# Patient Record
Sex: MALE | Race: BLACK OR AFRICAN AMERICAN | URBAN - METROPOLITAN AREA
[De-identification: names, ages, dates, MRNs, and addresses within clinical notes are randomized per-mention and may not be internally consistent; named-entity substitution may affect disease eponyms.]

---

## 2022-04-10 ENCOUNTER — HISTORICAL (OUTPATIENT)
Dept: ADMINISTRATIVE | Facility: HOSPITAL | Age: 22
End: 2022-04-10

## 2022-04-26 VITALS
BODY MASS INDEX: 23.29 KG/M2 | OXYGEN SATURATION: 100 % | HEIGHT: 72 IN | SYSTOLIC BLOOD PRESSURE: 121 MMHG | WEIGHT: 171.94 LBS | DIASTOLIC BLOOD PRESSURE: 68 MMHG

## 2024-05-30 ENCOUNTER — OFFICE VISIT (OUTPATIENT)
Dept: URGENT CARE | Facility: CLINIC | Age: 24
End: 2024-05-30

## 2024-05-30 ENCOUNTER — HOSPITAL ENCOUNTER (OUTPATIENT)
Dept: RADIOLOGY | Facility: HOSPITAL | Age: 24
Discharge: HOME | End: 2024-05-30
Attending: NURSE PRACTITIONER

## 2024-05-30 VITALS
TEMPERATURE: 98 F | BODY MASS INDEX: 20.45 KG/M2 | HEART RATE: 57 BPM | OXYGEN SATURATION: 100 % | SYSTOLIC BLOOD PRESSURE: 144 MMHG | HEIGHT: 72 IN | RESPIRATION RATE: 18 BRPM | WEIGHT: 151 LBS | DIASTOLIC BLOOD PRESSURE: 86 MMHG

## 2024-05-30 DIAGNOSIS — R11.2 NAUSEA AND VOMITING, UNSPECIFIED VOMITING TYPE: ICD-10-CM

## 2024-05-30 DIAGNOSIS — R10.31 INTERMITTENT RIGHT LOWER QUADRANT ABDOMINAL PAIN: Primary | ICD-10-CM

## 2024-05-30 DIAGNOSIS — R82.90 ABNORMAL URINE: ICD-10-CM

## 2024-05-30 LAB
AMORPH URATE CRY URNS QL MICRO: ABNORMAL /UL
BACTERIA #/AREA URNS AUTO: ABNORMAL /HPF
BILIRUB UR QL STRIP.AUTO: NEGATIVE
BILIRUB UR QL STRIP: NEGATIVE
C TRACH DNA SPEC QL NAA+PROBE: NOT DETECTED
CLARITY UR: ABNORMAL
COLOR UR AUTO: ABNORMAL
GLUCOSE UR QL STRIP: NEGATIVE
GLUCOSE UR QL STRIP: NORMAL
HGB UR QL STRIP: ABNORMAL
HYALINE CASTS #/AREA URNS LPF: ABNORMAL /LPF
KETONES UR QL STRIP: ABNORMAL
KETONES UR QL STRIP: POSITIVE
LEUKOCYTE ESTERASE UR QL STRIP: NEGATIVE
LEUKOCYTE ESTERASE UR QL STRIP: NEGATIVE
MUCOUS THREADS URNS QL MICRO: ABNORMAL /LPF
N GONORRHOEA DNA SPEC QL NAA+PROBE: NOT DETECTED
NITRITE UR QL STRIP: NEGATIVE
PH UR STRIP: 6.5 [PH]
PH, POC UA: 6
POC BLOOD, URINE: POSITIVE
POC NITRATES, URINE: NEGATIVE
PROT UR QL STRIP: ABNORMAL
PROT UR QL STRIP: POSITIVE
RBC #/AREA URNS AUTO: ABNORMAL /HPF
SOURCE (OHS): NORMAL
SP GR UR STRIP.AUTO: 1.04 (ref 1–1.03)
SP GR UR STRIP: 1.03 (ref 1–1.03)
SQUAMOUS #/AREA URNS LPF: ABNORMAL /HPF
UROBILINOGEN UR STRIP-ACNC: 2 (ref 0.3–2.2)
UROBILINOGEN UR STRIP-ACNC: ABNORMAL
WBC #/AREA URNS AUTO: ABNORMAL /HPF

## 2024-05-30 PROCEDURE — 99203 OFFICE O/P NEW LOW 30 MIN: CPT | Mod: S$PBB,,, | Performed by: NURSE PRACTITIONER

## 2024-05-30 PROCEDURE — 99214 OFFICE O/P EST MOD 30 MIN: CPT | Mod: PBBFAC,25 | Performed by: NURSE PRACTITIONER

## 2024-05-30 PROCEDURE — 81001 URINALYSIS AUTO W/SCOPE: CPT | Performed by: NURSE PRACTITIONER

## 2024-05-30 PROCEDURE — 87491 CHLMYD TRACH DNA AMP PROBE: CPT | Performed by: NURSE PRACTITIONER

## 2024-05-30 PROCEDURE — 87591 N.GONORRHOEAE DNA AMP PROB: CPT | Performed by: NURSE PRACTITIONER

## 2024-05-30 PROCEDURE — 25000003 PHARM REV CODE 250

## 2024-05-30 PROCEDURE — 87661 TRICHOMONAS VAGINALIS AMPLIF: CPT | Performed by: NURSE PRACTITIONER

## 2024-05-30 PROCEDURE — 74019 RADEX ABDOMEN 2 VIEWS: CPT | Mod: TC

## 2024-05-30 PROCEDURE — 81003 URINALYSIS AUTO W/O SCOPE: CPT | Mod: PBBFAC | Performed by: NURSE PRACTITIONER

## 2024-05-30 RX ORDER — ACETAMINOPHEN 500 MG
1000 TABLET ORAL
Status: COMPLETED | OUTPATIENT
Start: 2024-05-30 | End: 2024-05-30

## 2024-05-30 RX ORDER — ONDANSETRON 4 MG/1
4 TABLET, ORALLY DISINTEGRATING ORAL
Status: COMPLETED | OUTPATIENT
Start: 2024-05-30 | End: 2024-05-30

## 2024-05-30 RX ORDER — DOXYCYCLINE HYCLATE 100 MG
100 TABLET ORAL 2 TIMES DAILY
Qty: 14 TABLET | Refills: 0 | Status: SHIPPED | OUTPATIENT
Start: 2024-05-30 | End: 2024-06-06

## 2024-05-30 RX ADMIN — ACETAMINOPHEN 1000 MG: 500 TABLET ORAL at 04:05

## 2024-05-30 RX ADMIN — ONDANSETRON 4 MG: 4 TABLET, ORALLY DISINTEGRATING ORAL at 04:05

## 2024-05-30 NOTE — PROGRESS NOTES
"Subjective:      Patient ID: Naeem Lopez is a 23 y.o. male.    Vitals:  height is 5' 11.65" (1.82 m) and weight is 68.5 kg (151 lb). His oral temperature is 97.6 °F (36.4 °C). His blood pressure is 144/86 (abnormal) and his pulse is 57 (abnormal). His respiration is 18 and oxygen saturation is 100%.     Chief Complaint: Hip Pain (Emesis, right sided pain, started a few days ago)    Hip Pain      Pt presents with c/o lower right abdominal pain that radiates to groin and hip on right side that started Sunday 4 days ago, resolved without treatment for 3 days and came back today worse than before. Pain worsened today and he developed vomiting this morning, Reports 2 episodes of vomiting today. Pt denies heavy lifting, trauma or fall. Pt reports pain is worse with walking Pt reports that he stopped Marijuana use 2-3 days ago, to begin a new job.  Denies other recreational drug or alcohol use.  Patient does report unprotected sex with female partner.  Denies any known STD exposure.  Denies fever, headache, dizziness, weakness, shortness for breath, chest pain, cough, diarrhea, constipation, penile discharge, dysuria, and any urinary symptoms.  ROS   Objective:     Physical Exam   Constitutional: He is oriented to person, place, and time. He appears well-developed.  Non-toxic appearance. He does not appear ill. No distress.   HENT:   Head: Normocephalic and atraumatic.   Ears:   Right Ear: Tympanic membrane normal.   Nose: Nose normal. No purulent discharge. Right sinus exhibits no maxillary sinus tenderness and no frontal sinus tenderness. Left sinus exhibits no maxillary sinus tenderness and no frontal sinus tenderness.   Mouth/Throat: Uvula is midline. Mucous membranes are moist.   Eyes: Conjunctivae are normal. Right eye exhibits no discharge. Left eye exhibits no discharge. Extraocular movement intact   Neck: Neck supple. No neck rigidity present.   Cardiovascular: Regular rhythm and normal pulses. "   Pulmonary/Chest: Effort normal and breath sounds normal. No stridor. No respiratory distress. He has no wheezes. He has no rhonchi. He has no rales. He exhibits no tenderness.   Abdominal: Normal appearance and bowel sounds are normal. He exhibits no distension and no mass. Soft. There is abdominal tenderness. There is no rebound, no guarding, no left CVA tenderness and no right CVA tenderness. No hernia.   Genitourinary:         Musculoskeletal: Normal range of motion.         General: No swelling, tenderness, deformity or signs of injury. Normal range of motion.      Right lower leg: No edema.      Left lower leg: No edema.   Lymphadenopathy:     He has no cervical adenopathy.   Neurological: no focal deficit. He is alert and oriented to person, place, and time.   Skin: Skin is warm, dry and not diaphoretic. Capillary refill takes less than 2 seconds.   Psychiatric: His behavior is normal. Mood, judgment and thought content normal.   Nursing note and vitals reviewed.      Assessment:     1. Intermittent right lower quadrant abdominal pain    2. Nausea and vomiting, unspecified vomiting type    3. Abnormal urine      Results for orders placed or performed in visit on 05/30/24   POCT Urinalysis, Dipstick, Automated, W/O Scope   Result Value Ref Range    POC Blood, Urine Positive (A) Negative    POC Bilirubin, Urine Negative Negative    POC Urobilinogen, Urine 2.0 0.3 - 2.2    POC Ketones, Urine Positive (A) Negative    POC Protein, Urine Positive (A) Negative    POC Nitrates, Urine Negative Negative    POC Glucose, Urine Negative Negative    pH, UA 6.0     POC Specific Gravity, Urine 1.030 (A) 1.003 - 1.029    POC Leukocytes, Urine Negative Negative       X-Ray Abdomen Flat And Erect    Result Date: 5/30/2024  EXAMINATION: XR ABDOMEN FLAT AND ERECT CLINICAL HISTORY: Right lower quadrant pain COMPARISON: None FINDINGS: Flat and upright views of the abdomen.  There is a nonspecific, nonobstructive bowel gas pattern.   No large stool burden.  No free air.     No acute radiographic findings. Electronically signed by: Rustam Martel Date:    05/30/2024 Time:    17:23   Pt treated with Zofran and Tylenol, pain and nausea resolved. Pt appears well, noted stable vitals with no need for fluid resuscitation at the time of exam.   Plan:   Abdominal xray performed to assess for Kidney stone.  Abdominal x-ray normal without any suspected acute abdominal processes.    Abdominal exam benign.  ER precautions given and discussed  Discussed in detail about antibiotic prescription and Cannabus Hyperemesis Syndrome  Treated with doxycycline for suspected UTI/ STD  Encouraged to drink plenty fluids stay hydrated  Intermittent right lower quadrant abdominal pain  -     Chlamydia/GC, PCR  -     X-Ray Abdomen Flat And Erect  -     Urinalysis, Reflex to Urine Culture    Nausea and vomiting, unspecified vomiting type  -     POCT Urinalysis, Dipstick, Automated, W/O Scope  -     Trichomonas vaginalis Amplified RNA  -     Chlamydia/GC, PCR  -     X-Ray Abdomen Flat And Erect  -     Urinalysis, Reflex to Urine Culture    Abnormal urine  -     X-Ray Abdomen Flat And Erect  -     Urinalysis, Reflex to Urine Culture    Other orders  -     acetaminophen tablet 1,000 mg  -     ondansetron disintegrating tablet 4 mg  -     doxycycline (VIBRA-TABS) 100 MG tablet; Take 1 tablet (100 mg total) by mouth 2 (two) times daily. for 7 days  Dispense: 14 tablet; Refill: 0          Medical Decision Making:   Differential Diagnosis:   STD, Epididymitis, Cannabis hyperemesis syndrome

## 2024-05-30 NOTE — LETTER
May 30, 2024      [facility]  [address] [place] [id number]  Phone: [phone]       Patient: Naeem Lopez   YOB: 2000  Date of Visit: 05/30/2024    To Whom It May Concern:    Susanna Lopez  was at [facility] on 05/30/2024. The patient may return to work/school on 5/31/2024 with no restrictions. If you have any questions or concerns, or if I can be of further assistance, please do not hesitate to contact me.    Sincerely,    MARY Johnson

## 2024-06-03 LAB
SPECIMEN SOURCE: NORMAL
T VAGINALIS RRNA SPEC QL NAA+PROBE: NEGATIVE